# Patient Record
Sex: MALE | Race: BLACK OR AFRICAN AMERICAN | Employment: OTHER | URBAN - METROPOLITAN AREA
[De-identification: names, ages, dates, MRNs, and addresses within clinical notes are randomized per-mention and may not be internally consistent; named-entity substitution may affect disease eponyms.]

---

## 2018-06-23 ENCOUNTER — APPOINTMENT (OUTPATIENT)
Dept: GENERAL RADIOLOGY | Facility: CLINIC | Age: 73
End: 2018-06-23
Attending: EMERGENCY MEDICINE
Payer: MEDICAID

## 2018-06-23 ENCOUNTER — APPOINTMENT (OUTPATIENT)
Dept: ULTRASOUND IMAGING | Facility: CLINIC | Age: 73
End: 2018-06-23
Attending: EMERGENCY MEDICINE
Payer: MEDICAID

## 2018-06-23 ENCOUNTER — HOSPITAL ENCOUNTER (OUTPATIENT)
Facility: CLINIC | Age: 73
Setting detail: OBSERVATION
Discharge: HOME HEALTH | End: 2018-06-24
Attending: EMERGENCY MEDICINE | Admitting: HOSPITALIST
Payer: MEDICAID

## 2018-06-23 DIAGNOSIS — Z86.73 HISTORY OF CVA (CEREBROVASCULAR ACCIDENT): ICD-10-CM

## 2018-06-23 DIAGNOSIS — N30.00 ACUTE CYSTITIS WITHOUT HEMATURIA: Primary | ICD-10-CM

## 2018-06-23 DIAGNOSIS — N39.0 ACUTE UTI: ICD-10-CM

## 2018-06-23 LAB
ALBUMIN SERPL-MCNC: 3.5 G/DL (ref 3.4–5)
ALBUMIN UR-MCNC: 10 MG/DL
ALP SERPL-CCNC: 86 U/L (ref 40–150)
ALT SERPL W P-5'-P-CCNC: 12 U/L (ref 0–70)
ANION GAP SERPL CALCULATED.3IONS-SCNC: 6 MMOL/L (ref 3–14)
APPEARANCE UR: CLEAR
AST SERPL W P-5'-P-CCNC: 12 U/L (ref 0–45)
BACTERIA #/AREA URNS HPF: ABNORMAL /HPF
BASOPHILS # BLD AUTO: 0 10E9/L (ref 0–0.2)
BASOPHILS NFR BLD AUTO: 0.5 %
BILIRUB SERPL-MCNC: 0.6 MG/DL (ref 0.2–1.3)
BILIRUB UR QL STRIP: NEGATIVE
BUN SERPL-MCNC: 10 MG/DL (ref 7–30)
CALCIUM SERPL-MCNC: 8.6 MG/DL (ref 8.5–10.1)
CHLORIDE SERPL-SCNC: 109 MMOL/L (ref 94–109)
CO2 SERPL-SCNC: 26 MMOL/L (ref 20–32)
COLOR UR AUTO: YELLOW
CREAT SERPL-MCNC: 0.77 MG/DL (ref 0.66–1.25)
DIFFERENTIAL METHOD BLD: NORMAL
EOSINOPHIL # BLD AUTO: 0.2 10E9/L (ref 0–0.7)
EOSINOPHIL NFR BLD AUTO: 4 %
ERYTHROCYTE [DISTWIDTH] IN BLOOD BY AUTOMATED COUNT: 13.1 % (ref 10–15)
GFR SERPL CREATININE-BSD FRML MDRD: >90 ML/MIN/1.7M2
GLUCOSE BLDC GLUCOMTR-MCNC: 115 MG/DL (ref 70–99)
GLUCOSE BLDC GLUCOMTR-MCNC: 160 MG/DL (ref 70–99)
GLUCOSE SERPL-MCNC: 119 MG/DL (ref 70–99)
GLUCOSE UR STRIP-MCNC: NEGATIVE MG/DL
HCT VFR BLD AUTO: 41.5 % (ref 40–53)
HGB BLD-MCNC: 13.9 G/DL (ref 13.3–17.7)
HGB UR QL STRIP: ABNORMAL
IMM GRANULOCYTES # BLD: 0 10E9/L (ref 0–0.4)
IMM GRANULOCYTES NFR BLD: 0.2 %
KETONES UR STRIP-MCNC: NEGATIVE MG/DL
LACTATE BLD-SCNC: 1.3 MMOL/L (ref 0.7–2)
LEUKOCYTE ESTERASE UR QL STRIP: ABNORMAL
LYMPHOCYTES # BLD AUTO: 1.8 10E9/L (ref 0.8–5.3)
LYMPHOCYTES NFR BLD AUTO: 30.1 %
MCH RBC QN AUTO: 29.4 PG (ref 26.5–33)
MCHC RBC AUTO-ENTMCNC: 33.5 G/DL (ref 31.5–36.5)
MCV RBC AUTO: 88 FL (ref 78–100)
MONOCYTES # BLD AUTO: 0.4 10E9/L (ref 0–1.3)
MONOCYTES NFR BLD AUTO: 7.3 %
NEUTROPHILS # BLD AUTO: 3.5 10E9/L (ref 1.6–8.3)
NEUTROPHILS NFR BLD AUTO: 57.9 %
NITRATE UR QL: POSITIVE
NRBC # BLD AUTO: 0 10*3/UL
NRBC BLD AUTO-RTO: 0 /100
PH UR STRIP: 5.5 PH (ref 5–7)
PLATELET # BLD AUTO: 170 10E9/L (ref 150–450)
POTASSIUM SERPL-SCNC: 3.8 MMOL/L (ref 3.4–5.3)
PROT SERPL-MCNC: 7.5 G/DL (ref 6.8–8.8)
RBC # BLD AUTO: 4.73 10E12/L (ref 4.4–5.9)
RBC #/AREA URNS AUTO: 4 /HPF (ref 0–2)
SODIUM SERPL-SCNC: 141 MMOL/L (ref 133–144)
SOURCE: ABNORMAL
SP GR UR STRIP: 1.01 (ref 1–1.03)
SQUAMOUS #/AREA URNS AUTO: 1 /HPF (ref 0–1)
UROBILINOGEN UR STRIP-MCNC: NORMAL MG/DL (ref 0–2)
WBC # BLD AUTO: 6 10E9/L (ref 4–11)
WBC #/AREA URNS AUTO: 28 /HPF (ref 0–5)

## 2018-06-23 PROCEDURE — 87088 URINE BACTERIA CULTURE: CPT | Performed by: EMERGENCY MEDICINE

## 2018-06-23 PROCEDURE — 25000131 ZZH RX MED GY IP 250 OP 636 PS 637: Performed by: HOSPITALIST

## 2018-06-23 PROCEDURE — G0378 HOSPITAL OBSERVATION PER HR: HCPCS

## 2018-06-23 PROCEDURE — 96365 THER/PROPH/DIAG IV INF INIT: CPT

## 2018-06-23 PROCEDURE — 87040 BLOOD CULTURE FOR BACTERIA: CPT | Mod: 91 | Performed by: EMERGENCY MEDICINE

## 2018-06-23 PROCEDURE — 80053 COMPREHEN METABOLIC PANEL: CPT | Performed by: EMERGENCY MEDICINE

## 2018-06-23 PROCEDURE — 71046 X-RAY EXAM CHEST 2 VIEWS: CPT

## 2018-06-23 PROCEDURE — 00000146 ZZHCL STATISTIC GLUCOSE BY METER IP

## 2018-06-23 PROCEDURE — 93971 EXTREMITY STUDY: CPT | Mod: LT

## 2018-06-23 PROCEDURE — 25000128 H RX IP 250 OP 636: Performed by: EMERGENCY MEDICINE

## 2018-06-23 PROCEDURE — 85025 COMPLETE CBC W/AUTO DIFF WBC: CPT | Performed by: EMERGENCY MEDICINE

## 2018-06-23 PROCEDURE — 99219 ZZC INITIAL OBSERVATION CARE,LEVL II: CPT | Performed by: HOSPITALIST

## 2018-06-23 PROCEDURE — 87186 SC STD MICRODIL/AGAR DIL: CPT | Performed by: EMERGENCY MEDICINE

## 2018-06-23 PROCEDURE — 25000132 ZZH RX MED GY IP 250 OP 250 PS 637: Performed by: HOSPITALIST

## 2018-06-23 PROCEDURE — 36415 COLL VENOUS BLD VENIPUNCTURE: CPT

## 2018-06-23 PROCEDURE — 83605 ASSAY OF LACTIC ACID: CPT | Performed by: EMERGENCY MEDICINE

## 2018-06-23 PROCEDURE — 87086 URINE CULTURE/COLONY COUNT: CPT | Performed by: EMERGENCY MEDICINE

## 2018-06-23 PROCEDURE — 96372 THER/PROPH/DIAG INJ SC/IM: CPT

## 2018-06-23 PROCEDURE — 81001 URINALYSIS AUTO W/SCOPE: CPT | Performed by: EMERGENCY MEDICINE

## 2018-06-23 PROCEDURE — 99285 EMERGENCY DEPT VISIT HI MDM: CPT | Mod: 25

## 2018-06-23 RX ORDER — DEXTROSE MONOHYDRATE 25 G/50ML
25-50 INJECTION, SOLUTION INTRAVENOUS
Status: DISCONTINUED | OUTPATIENT
Start: 2018-06-23 | End: 2018-06-24 | Stop reason: HOSPADM

## 2018-06-23 RX ORDER — SODIUM CHLORIDE 9 MG/ML
1000 INJECTION, SOLUTION INTRAVENOUS CONTINUOUS
Status: DISCONTINUED | OUTPATIENT
Start: 2018-06-23 | End: 2018-06-23

## 2018-06-23 RX ORDER — AMOXICILLIN 250 MG
1 CAPSULE ORAL 2 TIMES DAILY PRN
Status: DISCONTINUED | OUTPATIENT
Start: 2018-06-23 | End: 2018-06-24 | Stop reason: HOSPADM

## 2018-06-23 RX ORDER — ACETAMINOPHEN 650 MG/1
650 SUPPOSITORY RECTAL EVERY 4 HOURS PRN
Status: DISCONTINUED | OUTPATIENT
Start: 2018-06-23 | End: 2018-06-24 | Stop reason: HOSPADM

## 2018-06-23 RX ORDER — ONDANSETRON 2 MG/ML
4 INJECTION INTRAMUSCULAR; INTRAVENOUS EVERY 30 MIN PRN
Status: DISCONTINUED | OUTPATIENT
Start: 2018-06-23 | End: 2018-06-24 | Stop reason: HOSPADM

## 2018-06-23 RX ORDER — NICOTINE POLACRILEX 4 MG
15-30 LOZENGE BUCCAL
Status: DISCONTINUED | OUTPATIENT
Start: 2018-06-23 | End: 2018-06-24 | Stop reason: HOSPADM

## 2018-06-23 RX ORDER — ONDANSETRON 4 MG/1
4 TABLET, ORALLY DISINTEGRATING ORAL EVERY 4 HOURS PRN
Qty: 10 TABLET | Refills: 0 | Status: SHIPPED | OUTPATIENT
Start: 2018-06-23 | End: 2018-06-26

## 2018-06-23 RX ORDER — CEPHALEXIN 500 MG/1
500 CAPSULE ORAL 4 TIMES DAILY
Qty: 40 CAPSULE | Refills: 0 | Status: SHIPPED | OUTPATIENT
Start: 2018-06-23 | End: 2018-06-24

## 2018-06-23 RX ORDER — ASPIRIN 81 MG/1
81 TABLET, CHEWABLE ORAL DAILY
Status: DISCONTINUED | OUTPATIENT
Start: 2018-06-24 | End: 2018-06-24 | Stop reason: HOSPADM

## 2018-06-23 RX ORDER — POLYETHYLENE GLYCOL 3350 17 G/17G
17 POWDER, FOR SOLUTION ORAL DAILY PRN
Status: DISCONTINUED | OUTPATIENT
Start: 2018-06-23 | End: 2018-06-24 | Stop reason: HOSPADM

## 2018-06-23 RX ORDER — ONDANSETRON 4 MG/1
4 TABLET, ORALLY DISINTEGRATING ORAL EVERY 6 HOURS PRN
Status: DISCONTINUED | OUTPATIENT
Start: 2018-06-23 | End: 2018-06-24 | Stop reason: HOSPADM

## 2018-06-23 RX ORDER — LISINOPRIL 10 MG/1
10 TABLET ORAL DAILY
Status: DISCONTINUED | OUTPATIENT
Start: 2018-06-24 | End: 2018-06-24 | Stop reason: HOSPADM

## 2018-06-23 RX ORDER — AMOXICILLIN 250 MG
2 CAPSULE ORAL 2 TIMES DAILY PRN
Status: DISCONTINUED | OUTPATIENT
Start: 2018-06-23 | End: 2018-06-24 | Stop reason: HOSPADM

## 2018-06-23 RX ORDER — LIDOCAINE 40 MG/G
CREAM TOPICAL
Status: DISCONTINUED | OUTPATIENT
Start: 2018-06-23 | End: 2018-06-24 | Stop reason: HOSPADM

## 2018-06-23 RX ORDER — ACETAMINOPHEN 325 MG/1
650 TABLET ORAL EVERY 4 HOURS PRN
Status: DISCONTINUED | OUTPATIENT
Start: 2018-06-23 | End: 2018-06-24 | Stop reason: HOSPADM

## 2018-06-23 RX ORDER — ONDANSETRON 2 MG/ML
4 INJECTION INTRAMUSCULAR; INTRAVENOUS EVERY 6 HOURS PRN
Status: DISCONTINUED | OUTPATIENT
Start: 2018-06-23 | End: 2018-06-24 | Stop reason: HOSPADM

## 2018-06-23 RX ORDER — SODIUM CHLORIDE 9 MG/ML
1000 INJECTION, SOLUTION INTRAVENOUS CONTINUOUS
Status: DISCONTINUED | OUTPATIENT
Start: 2018-06-23 | End: 2018-06-24 | Stop reason: HOSPADM

## 2018-06-23 RX ORDER — NALOXONE HYDROCHLORIDE 0.4 MG/ML
.1-.4 INJECTION, SOLUTION INTRAMUSCULAR; INTRAVENOUS; SUBCUTANEOUS
Status: DISCONTINUED | OUTPATIENT
Start: 2018-06-23 | End: 2018-06-24 | Stop reason: HOSPADM

## 2018-06-23 RX ORDER — CEFTRIAXONE 1 G/1
1 INJECTION, POWDER, FOR SOLUTION INTRAMUSCULAR; INTRAVENOUS EVERY 24 HOURS
Status: DISCONTINUED | OUTPATIENT
Start: 2018-06-24 | End: 2018-06-24 | Stop reason: HOSPADM

## 2018-06-23 RX ORDER — CEFTRIAXONE 1 G/1
1 INJECTION, POWDER, FOR SOLUTION INTRAMUSCULAR; INTRAVENOUS ONCE
Status: COMPLETED | OUTPATIENT
Start: 2018-06-23 | End: 2018-06-23

## 2018-06-23 RX ADMIN — CEFTRIAXONE 1 G: 1 INJECTION, POWDER, FOR SOLUTION INTRAMUSCULAR; INTRAVENOUS at 13:04

## 2018-06-23 RX ADMIN — INSULIN ASPART 1 UNITS: 100 INJECTION, SOLUTION INTRAVENOUS; SUBCUTANEOUS at 19:35

## 2018-06-23 RX ADMIN — SODIUM CHLORIDE 1000 ML: 9 INJECTION, SOLUTION INTRAVENOUS at 17:10

## 2018-06-23 RX ADMIN — SENNOSIDES AND DOCUSATE SODIUM 2 TABLET: 8.6; 5 TABLET ORAL at 21:57

## 2018-06-23 RX ADMIN — SODIUM CHLORIDE 1000 ML: 9 INJECTION, SOLUTION INTRAVENOUS at 12:35

## 2018-06-23 NOTE — H&P
[facility]    History and Physical  Hospitalist       Date of Admission:  6/23/2018    Assessment & Plan   Hali Miller is a 73 year old Amheric-speaking male with past history DM II, HTN, CVA with left hemiparesis, sepsis due to prostate abscess s/p TURP 8/2017 who presents with dysuria, found to have UTI.    UTI: Admission UA with 28 WBC, positive nitrites and LE.  No SIRS criteria present.  - continue ceftriaxone and follow urine culture    Hypertension: Continue prior to admission lisinopril    Diabetes mellitus type 2: Hold prior to admission metformin, start medium dose sliding scale insulin.    History of CVA with left hemiparesis: Continue prior to admission aspirin.  Hold prior to admission statin as Obs.      DVT Prophylaxis: Low Risk/Ambulatory with no VTE prophylaxis indicated  Code Status: Full Code; this was unable to be discussed with family as no  present    # Pain Assessment:  Current Pain Score 6/23/2018   Pain score (0-10) 5   Hali s pain level was assessed and he currently denies pain.          Disposition: Expected discharge in 1 days once symptoms improved, no signs of sepsis.    Niles Hameed    Primary Care Physician   [facility]    Chief Complaint   dysuria    History is obtained from discussion with patient's nephew via phone.  Nephew was present during discussion with patient and ED physician and had left at time of my arrival.  Family declined professional  services.    History of Present Illness   Hali Miller is a 73 year old male who presents with dysuria.  Patient was reportedly complaining of dysuria over the past 1-2 weeks.  Family also noted poor appetite and poor sleep over this time.  Nephew reported fever at home today, though it is unclear how high.  Family also noted left foot swelling, patient also complained of left knee pain.  There have been no known falls.  Family also complaining that metformin  causes issues with nausea and vomiting and results in poor appetite.  Lastly, patient's wife noted that he has been breathing heavily at night.  Unable to obtain further review of systems secondary to no  present at bedside during my visit with patient.  Workup in the emergency room was largely unremarkable other than abnormal urinalysis.  Secondary to his history of prostate abscess with sepsis, family elected for observation overnight prior to taking patient home.    Past Medical History    Diabetes mellitus type 2  Hypertension  CVA with left hemiparesis  History of prostate abscess with sepsis status post TURP    Past Surgical History   TURP August 2017    Prior to Admission Medications   Prior to Admission Medications   Prescriptions Last Dose Informant Patient Reported? Taking?   ASPIRIN PO 6/22/2018 at am Son Yes Yes   Sig: Take 81 mg by mouth daily   Atorvastatin Calcium (LIPITOR PO) Past Week at Unknown time Son Yes Yes   Sig: Take 20 mg by mouth daily    LISINOPRIL PO Past Week at Unknown time Son Yes Yes   Sig: Take 10 mg by mouth daily   METFORMIN HCL ER PO Past Week at Unknown time Son Yes Yes   Sig: Take 1,000 mg by mouth 2 times daily      Facility-Administered Medications: None     Allergies   No Known Allergies    Social History   I have personally reviewed the social history with the patient showing no tobacco or alcohol use per report of nephew.    Family History   Nephew reports no known significant family history.    Review of Systems   10 point review of systems was unable to be obtained secondary to lack of professional  services.    Physical Exam   Temp: 99.4  F (37.4  C) Temp src: Temporal BP: 149/79 Pulse: 70 Heart Rate: 73 Resp: 14 SpO2: 98 % O2 Device: None (Room air)    Vital Signs with Ranges  Temp:  [99.4  F (37.4  C)] 99.4  F (37.4  C)  Pulse:  [70-73] 70  Heart Rate:  [73] 73  Resp:  [14-16] 14  BP: (140-149)/(72-79) 149/79  SpO2:  [95 %-98 %] 98 %  160 lbs 0  oz    Constitutional: well developed, well nourished male in no acute distress  Eyes: pupils equal, round and reactive to light, no icterus  HEENT: head normocephalic, atraumatic, mucous membranes moist, no cervical lymphadenopathy  Respiratory: small inspirations, but appear bilaterally without crackles or wheezes, no tachypnea  Cardiovascular: regular rate and rhythm, normal S1/S2, no murmur, rubs or gallops  GI: abdomen soft, non-tender, non-distended, normal bowel sounds, no hepatosplenomegaly  Lymph/Hematologic: 1+ left lower leg edema  Skin: No rash or bruising  Musculoskeletal: Extremities warm and well perfused  Neurologic: alert, cranial nerves grossly intact, gross left sided weakness      Data   Data reviewed today:  I personally reviewed no images or EKG's today.    Recent Labs  Lab 06/23/18  1130   WBC 6.0   HGB 13.9   MCV 88         POTASSIUM 3.8   CHLORIDE 109   CO2 26   BUN 10   CR 0.77   ANIONGAP 6   AMARI 8.6   *   ALBUMIN 3.5   PROTTOTAL 7.5   BILITOTAL 0.6   ALKPHOS 86   ALT 12   AST 12       Recent Results (from the past 24 hour(s))   XR Chest 2 Views    Narrative    XR CHEST 2 VW 6/23/2018 12:03 PM    HISTORY: fever;       Impression    IMPRESSION: Negative.    JEAN-PIERRE POOLE MD   US Lower Extremity Venous Duplex Left    Narrative    US LOWER EXTREMITY VENOUS DUPLEX LEFT 6/23/2018 12:24 PM    HISTORY:  Leg swelling.    FINDINGS: Negative. No evidence for DVT. Short term followup  recommended if symptoms persist.     Doppler wave form analysis performed.    JEAN-PIERRE POOLE MD

## 2018-06-23 NOTE — IP AVS SNAPSHOT
STOP!!! DO NOT PRINT OR REFERENCE THIS AVS!!!  AVS displayed here is NOT the version that was given to the patient at discharge.  GO TO CHART REVIEW to print or review a copy of the AVS that was frozen/printed at time of discharge.                           MRN:[id number]                      After Visit Summary   6/23/2018    Hali Miller    MRN: [id number]           Thank you!     Thank you for choosing [facility] for your care. Our goal is always to provide you with excellent care. Hearing back from our patients is one way we can continue to improve our services. Please take a few minutes to complete the written survey that you may receive in the mail after you visit with us. Thank you!        Patient Information     Date Of Birth          1945        About your hospital stay     You were admitted on:  June 23, 2018 [facility]    You were discharged on:  June 24, 2018        Reason for your hospital stay       You were here for evaluation of weakness and urinary tract infection.                  Who to Call     For medical emergencies, please call 911.  For non-urgent questions about your medical care, please call your primary care provider or clinic, [phone]          Attending Provider     Provider Specialty    Chiquis Chung MD Emergency Medicine    Chiqui Moreno MD Emergency Medicine    Niles Hameed MD Internal Medicine       Primary Care Provider Office Phone # Fax #    [facility] [phone]      After Care Instructions     Activity       Your activity upon discharge: activity as tolerated            Diet       Follow this diet upon discharge: Orders Placed This Encounter      Moderate Consistent CHO Diet            Discharge Instructions       Continue antibiotic for 3 days    Make sure to drink plenty of fluids    For constipation at home you can try over the counter miralax or senna. You  can titrate dose of miralax as needed: for example- start with one capful per day. If stools are too loose you can try every other day or use only 1/2 capful instead.                  Follow-up Appointments     Follow-up and recommended labs and tests        Follow up with primary care provider next week to establish care and for hospital follow up.                  Additional Services     Home Care OT Referral for Hospital Discharge       OT to eval and treat    Your provider has ordered home care - occupational therapy. If you have not been contacted within 2 days of your discharge please call the department phone number listed on the top of this document.            Home Care PT Referral for Hospital Discharge       PT to eval and treat    Your provider has ordered home care - physical therapy. If you have not been contacted within 2 days of your discharge please call the department phone number listed on the top of this document.            Home Care Social Service Referral for Hospital Discharge        to assist family who provides 24 hour total cares. May need more equipment at home, transportation resources, etc.     Your provider has ordered home care - . If you have not been contacted within 2 days of your discharge please call the department phone number listed on the top of this document.            Home care nursing referral       RN skilled nursing visit. RN to assess vital signs and weight, respiratory and cardiac status, incision for signs/symptoms of infection, hydration, nutrition and bowel status and home safety.  Home health aide to assist with bathing, cares.     Your provider has ordered home care nursing services. If you have not been contacted within 2 days of your discharge please call the inpatient department phone number at [phone] .                  Further instructions from your care team       Return if you have worsening symptoms.   Discharge  Instructions  Urinary Tract Infection  You or your child have been diagnosed with a urinary tract infection, or UTI. The urinary tract includes the kidneys (which make urine/pee), ureters (the tubes that carry urine/pee from the kidneys to the bladder), the bladder (which stores urine/pee), and urethra (the tube that carries urine/pee out of the bladder). Urinary tract infections occur when bacteria travel up the urethra into the bladder (bladder infection) and, in some cases, from there into the kidneys (kidney infection).  Generally, every Emergency Department visit should have a follow-up clinic visit with either a primary or a specialty clinic/provider. Please follow-up as instructed by your emergency provider today.  Return to the Emergency Department if:    You or your child have severe back pain.    You or your child are vomiting (throwing up) so that you cannot take your medicine.    You or your child have a new fever (had not previously had a fever) over 101 F.    You or your child have confusion or are very weak, or feel very ill.    Your child seems much more ill, will not wake up, will not respond right, or is crying for a long time and will not calm down.    You or your child are showing signs of dehydration. These signs may include decreased urination (pee), dry mouth/gums/tongue, or decreased activity.    Follow-up with your provider:     Children under 24 months need to be seen by their regular provider within one week after a diagnosis of a UTI. It may be necessary to do some more tests to look at the child s kidney or bladder.    You should begin to feel better within 24 - 48 hours of starting your antibiotic; follow-up with your regular clinic/doctor/provider if this is not the case.    Treatment:     You will be treated with an antibiotic to kill the bacteria. We have to make an educated guess, based on what we know about common bacteria and antibiotics, as to which antibiotic will work for your  "infection. We will be correct most times but there will be some cases where the antibiotic chosen is not correct (see urine cultures below).    Take a pain medication such as acetaminophen (Tylenol ) or ibuprofen (Advil , Motrin , Nuprin ).    Phenazopyridine (Pyridium , Uristat ) is a prescription medication that numbs the bladder to reduce the burning pain of some UTIs.  The same medication is available in a non-prescription version (Azo-Standard , Urodol ). This medication will change the color of the urine and tears (usually blue or orange). If you wear contacts, do not wear them while taking this medication as they may be stained by the medication.    Urine Cultures:    If indicated, a urine culture may have been performed today. This test generally takes 24-48 hours to complete so the results are not known at this time. The results can confirm that an infection is present but also determine which antibiotic is effective for the specific bacteria that is causing the infection. If your urine culture shows that the antibiotic you were given today will not work to treat your infection, we will attempt to contact you to make arrangements to change the antibiotic. If the culture confirms that the antibiotic is effective for your infection, you will not be contacted. We often recommend follow-up with your regular physician/provider on the culture results regardless of this process.    Antibiotic Warning:     If you have been placed on antibiotics - watch for signs of allergic reaction.  These include rash, lip swelling, difficulty breathing, wheezing, and dizziness.  If you develop any of these symptoms, stop the antibiotic immediately and go to an emergency room or urgent care for evaluation.    Probiotics: If you have been given an antibiotic, you may want to also take a probiotic pill or eat yogurt with live cultures. Probiotics have \"good bacteria\" to help your intestines stay healthy. Studies have shown that " "probiotics help prevent diarrhea and other intestine problems (including C. diff infection) when you take antibiotics. You can buy these without a prescription in the pharmacy section of the store.   If you were given a prescription for medicine here today, be sure to read all of the information (including the package insert) that comes with your prescription.  This will include important information about the medicine, its side effects, and any warnings that you need to know about.  The pharmacist who fills the prescription can provide more information and answer questions you may have about the medicine.  If you have questions or concerns that the pharmacist cannot address, please call or return to the Emergency Department.   Remember that you can always come back to the Emergency Department if you are not able to see your regular provider in the amount of time listed above, if you get any new symptoms, or if there is anything that worries you.    Pending Results     Date and Time Order Name Status Description    6/23/2018 1206 Urine Culture Aerobic Bacterial Preliminary     6/23/2018 1120 Blood culture Preliminary     6/23/2018 1120 Blood culture Preliminary             Statement of Approval     Ordered          06/24/18 1121  I have reviewed and agree with all the recommendations and orders detailed in this document.  EFFECTIVE NOW     Approved and electronically signed by:  Niles Hameed MD             Admission Information     Date & Time Department Dept. Phone    6/23/2018 [facility] [phone]      Your Vitals Were     Blood Pressure Pulse Temperature Respirations Height Weight    145/80 (BP Location: Right arm) 67 97.1  F (36.2  C) (Axillary) 16 5' 8\" (1.727 m) 153 lb 11.2 oz (69.7 kg)    Pulse Oximetry BMI (Body Mass Index)                96% 23.37 kg/m2          MyChart Information     [employer] lets you send messages to your doctor, view your test results, renew your prescriptions, " "schedule appointments and more. To sign up, go to [email or web address] . Click on \"Log in\" on the left side of the screen, which will take you to the Welcome page. Then click on \"Sign up Now\" on the right side of the page.     You will be asked to enter the access code listed below, as well as some personal information. Please follow the directions to create your username and password.     Your access code is: [email or web address]  Expires: 2018  1:33 PM     Your access code will  in 90 days. If you need help or a new code, please call your [place] clinic or [phone].        Care EveryWhere ID     This is your Care EveryWhere ID. This could be used by other organizations to access your [place] medical records  [id number]        Equal Access to Services     MAYELA CROOKS : Ezekiel Lackey, jenny veloz, brennan castillo, suhail oneill . So [facility] [phone].    ATENCIÓN: Si habla español, tiene a chase disposición servicios gratuitos de asistencia lingüística. Andrés al [phone].    We comply with applicable federal civil rights laws and [place] laws. We do not discriminate on the basis of race, color, national origin, age, disability, sex, sexual orientation, or gender identity.               Review of your medicines      START taking        Dose / Directions    ciprofloxacin 500 MG tablet   Commonly known as:  CIPRO   Used for:  Acute cystitis without hematuria        Dose:  500 mg   Take 1 tablet (500 mg) by mouth 2 times daily for 7 days   Quantity:  14 tablet   Refills:  0       ondansetron 4 MG ODT tab   Commonly known as:  ZOFRAN ODT        Dose:  4 mg   Take 1 tablet (4 mg) by mouth every 4 hours as needed for nausea   Quantity:  10 tablet   Refills:  0         CONTINUE these medicines which have NOT CHANGED        Dose / Directions    ASPIRIN PO        Dose:  81 mg   Take 81 mg by mouth daily   Refills:  0       LIPITOR PO   Notes to " Patient:  Resume per patient        Dose:  20 mg   Take 20 mg by mouth daily   Refills:  0       LISINOPRIL PO        Dose:  10 mg   Take 10 mg by mouth daily   Refills:  0       METFORMIN HCL ER PO   Notes to Patient:  Resume per patient        Dose:  1000 mg   Take 1,000 mg by mouth 2 times daily   Refills:  0            Where to get your medicines      These medications were sent to [facility] [place], [place] - [address]  [address], [place] [address]     Phone:  [phone]     ciprofloxacin 500 MG tablet         Some of these will need a paper prescription and others can be bought over the counter. Ask your nurse if you have questions.     Bring a paper prescription for each of these medications     ondansetron 4 MG ODT tab                Protect others around you: Learn how to safely use, store and throw away your medicines at www.disposemymeds.org.        ANTIBIOTIC INSTRUCTION     You've Been Prescribed an Antibiotic - Now What?  Your healthcare team thinks that you or your loved one might have an infection. Some infections can be treated with antibiotics, which are powerful, life-saving drugs. Like all medications, antibiotics have side effects and should only be used when necessary. There are some important things you should know about your antibiotic treatment.      Your healthcare team may run tests before you start taking an antibiotic.    Your team may take samples (e.g., from your blood, urine or other areas) to run tests to look for bacteria. These test can be important to determine if you need an antibiotic at all and, if you do, which antibiotic will work best.      Within a few days, your healthcare team might change or even stop your antibiotic.    Your team may start you on an antibiotic while they are working to find out what is making you sick.    Your team might change your antibiotic because test results show that a different antibiotic would be better to  treat your infection.    In some cases, once your team has more information, they learn that you do not need an antibiotic at all. They may find out that you don't have an infection, or that the antibiotic you're taking won't work against your infection. For example, an infection caused by a virus can't be treated with antibiotics. Staying on an antibiotic when you don't need it is more likely to be harmful than helpful.      You may experience side effects from your antibiotic.    Like all medications, antibiotics have side effects. Some of these can be serious.    Let you healthcare team know if you have any known allergies when you are admitted to the hospital.    One significant side effect of nearly all antibiotics is the risk of severe and sometimes deadly diarrhea caused by Clostridium difficile (C. Difficile). This occurs when a person takes antibiotics because some good germs are destroyed. Antibiotic use allows C. diificile to take over, putting patients at high risk for this serious infection.    As a patient or caregiver, it is important to understand your or your loved one's antibiotic treatment. It is especially important for caregivers to speak up when patients can't speak for themselves. Here are some important questions to ask your healthcare team.    What infection is this antibiotic treating and how do you know I have that infection?    What side effects might occur from this antibiotic?    How long will I need to take this antibiotic?    Is it safe to take this antibiotic with other medications or supplements (e.g., vitamins) that I am taking?     Are there any special directions I need to know about taking this antibiotic? For example, should I take it with food?    How will I be monitored to know whether my infection is responding to the antibiotic?    What tests may help to make sure the right antibiotic is prescribed for me?      Information provided by:  www.cdc.gov/getsmart  U.S. Department  of Health and Human Services  Centers for disease Control and Prevention  National Center for Emerging and Zoonotic Infectious Diseases  Division of Healthcare Quality Promotion             Medication List: This is a list of all your medications and when to take them. Check marks below indicate your daily home schedule. Keep this list as a reference.      Medications           Morning Afternoon Evening Bedtime As Needed    ASPIRIN PO   Take 81 mg by mouth daily   Last time this was given:  81 mg on 6/24/2018  8:53 AM            6/25/18                       ciprofloxacin 500 MG tablet   Commonly known as:  CIPRO   Take 1 tablet (500 mg) by mouth 2 times daily for 7 days                                LIPITOR PO   Take 20 mg by mouth daily   Notes to Patient:  Resume per patient                                LISINOPRIL PO   Take 10 mg by mouth daily   Last time this was given:  10 mg on 6/24/2018  8:53 AM            6/25/18                       METFORMIN HCL ER PO   Take 1,000 mg by mouth 2 times daily   Notes to Patient:  Resume per patient                                ondansetron 4 MG ODT tab   Commonly known as:  ZOFRAN ODT   Take 1 tablet (4 mg) by mouth every 4 hours as needed for nausea

## 2018-06-23 NOTE — PROGRESS NOTES
RECEIVING UNIT ED HANDOFF REVIEW    ED Nurse Handoff Report was reviewed by: Zenaida Blackwood on June 23, 2018 at 3:37 PM

## 2018-06-23 NOTE — ED AVS SNAPSHOT
Emergency Department    [address] [facility] [address]    Phone:  [phone]    Fax:  [phone]                                       Hali Miller   MRN: [id number]    Department:   Emergency Department   Date of Visit:  6/23/2018           After Visit Summary Signature Page     I have received my discharge instructions, and my questions have been answered. I have discussed any challenges I see with this plan with the nurse or doctor.    ..........................................................................................................................................  Patient/Patient Representative Signature      ..........................................................................................................................................  Patient Representative Print Name and Relationship to Patient    ..................................................               ................................................  Date                                            Time    ..........................................................................................................................................  Reviewed by Signature/Title    ...................................................              ..............................................  Date                                                            Time

## 2018-06-23 NOTE — IP AVS SNAPSHOT
"    [place] [facility]: [phone]                                              INTERAGENCY TRANSFER FORM - PHYSICIAN ORDERS   2018                    Hospital Admission Date: 2018  TIMOTHY CATES   : 1945  Sex: Male        Attending Provider: (none)    Allergies:  No Known Allergies    Infection:  None   Service:  HOSPITALIST    Ht:  5' 8\" (1.727 m)   Wt:  153 lb 11.2 oz (69.7 kg)   Admission Wt:  160 lb (72.6 kg)    BMI:  23.37 kg/m 2   BSA:  1.83 m 2            Patient PCP Information     Provider PCP Type    [facility]      ED Clinical Impression     Diagnosis Description Comment Added By Time Added    Acute UTI [N39.0] Acute UTI [N39.0]  Chiqui Moreno MD 2018 12:55 PM      Hospital Problems as of 2018              Priority Class Noted POA    UTI (urinary tract infection) Medium  2018 Yes      Non-Hospital Problems as of 2018     None      Code Status History     Date Active Date Inactive Code Status Order ID Comments User Context    2018 11:21 AM  Full Code [id number]  Lian Jo PA-C Outpatient    2018  4:53 PM 2018 11:21 AM Full Code [id number]  Niles Hameed MD Inpatient         Medication Review      START taking        Dose / Directions Comments    ciprofloxacin 500 MG tablet   Commonly known as:  CIPRO   Used for:  Acute cystitis without hematuria        Dose:  500 mg   Take 1 tablet (500 mg) by mouth 2 times daily for 7 days   Quantity:  14 tablet   Refills:  0        ondansetron 4 MG ODT tab   Commonly known as:  ZOFRAN ODT        Dose:  4 mg   Take 1 tablet (4 mg) by mouth every 4 hours as needed for nausea   Quantity:  10 tablet   Refills:  0          CONTINUE these medications which have NOT CHANGED        Dose / Directions Comments    ASPIRIN PO        Dose:  81 mg   Take 81 mg by mouth daily   Refills:  0        LIPITOR PO   Notes to Patient:  Resume per patient        Dose:  20 mg "   Take 20 mg by mouth daily   Refills:  0        LISINOPRIL PO        Dose:  10 mg   Take 10 mg by mouth daily   Refills:  0        METFORMIN HCL ER PO   Notes to Patient:  Resume per patient        Dose:  1000 mg   Take 1,000 mg by mouth 2 times daily   Refills:  0                  Further instructions from your care team       Return if you have worsening symptoms.   Discharge Instructions  Urinary Tract Infection  You or your child have been diagnosed with a urinary tract infection, or UTI. The urinary tract includes the kidneys (which make urine/pee), ureters (the tubes that carry urine/pee from the kidneys to the bladder), the bladder (which stores urine/pee), and urethra (the tube that carries urine/pee out of the bladder). Urinary tract infections occur when bacteria travel up the urethra into the bladder (bladder infection) and, in some cases, from there into the kidneys (kidney infection).  Generally, every Emergency Department visit should have a follow-up clinic visit with either a primary or a specialty clinic/provider. Please follow-up as instructed by your emergency provider today.  Return to the Emergency Department if:    You or your child have severe back pain.    You or your child are vomiting (throwing up) so that you cannot take your medicine.    You or your child have a new fever (had not previously had a fever) over 101 F.    You or your child have confusion or are very weak, or feel very ill.    Your child seems much more ill, will not wake up, will not respond right, or is crying for a long time and will not calm down.    You or your child are showing signs of dehydration. These signs may include decreased urination (pee), dry mouth/gums/tongue, or decreased activity.    Follow-up with your provider:     Children under 24 months need to be seen by their regular provider within one week after a diagnosis of a UTI. It may be necessary to do some more tests to look at the child s kidney or  bladder.    You should begin to feel better within 24 - 48 hours of starting your antibiotic; follow-up with your regular clinic/doctor/provider if this is not the case.    Treatment:     You will be treated with an antibiotic to kill the bacteria. We have to make an educated guess, based on what we know about common bacteria and antibiotics, as to which antibiotic will work for your infection. We will be correct most times but there will be some cases where the antibiotic chosen is not correct (see urine cultures below).    Take a pain medication such as acetaminophen (Tylenol ) or ibuprofen (Advil , Motrin , Nuprin ).    Phenazopyridine (Pyridium , Uristat ) is a prescription medication that numbs the bladder to reduce the burning pain of some UTIs.  The same medication is available in a non-prescription version (Azo-Standard , Urodol ). This medication will change the color of the urine and tears (usually blue or orange). If you wear contacts, do not wear them while taking this medication as they may be stained by the medication.    Urine Cultures:    If indicated, a urine culture may have been performed today. This test generally takes 24-48 hours to complete so the results are not known at this time. The results can confirm that an infection is present but also determine which antibiotic is effective for the specific bacteria that is causing the infection. If your urine culture shows that the antibiotic you were given today will not work to treat your infection, we will attempt to contact you to make arrangements to change the antibiotic. If the culture confirms that the antibiotic is effective for your infection, you will not be contacted. We often recommend follow-up with your regular physician/provider on the culture results regardless of this process.    Antibiotic Warning:     If you have been placed on antibiotics - watch for signs of allergic reaction.  These include rash, lip swelling, difficulty  "breathing, wheezing, and dizziness.  If you develop any of these symptoms, stop the antibiotic immediately and go to an emergency room or urgent care for evaluation.    Probiotics: If you have been given an antibiotic, you may want to also take a probiotic pill or eat yogurt with live cultures. Probiotics have \"good bacteria\" to help your intestines stay healthy. Studies have shown that probiotics help prevent diarrhea and other intestine problems (including C. diff infection) when you take antibiotics. You can buy these without a prescription in the pharmacy section of the store.   If you were given a prescription for medicine here today, be sure to read all of the information (including the package insert) that comes with your prescription.  This will include important information about the medicine, its side effects, and any warnings that you need to know about.  The pharmacist who fills the prescription can provide more information and answer questions you may have about the medicine.  If you have questions or concerns that the pharmacist cannot address, please call or return to the Emergency Department.   Remember that you can always come back to the Emergency Department if you are not able to see your regular provider in the amount of time listed above, if you get any new symptoms, or if there is anything that worries you.    Summary of Visit     Reason for your hospital stay       You were here for evaluation of weakness and urinary tract infection.             After Care     Activity       Your activity upon discharge: activity as tolerated       Diet       Follow this diet upon discharge: Orders Placed This Encounter      Moderate Consistent CHO Diet       Discharge Instructions       Continue antibiotic for 3 days    Make sure to drink plenty of fluids    For constipation at home you can try over the counter miralax or senna. You can titrate dose of miralax as needed: for example- start with one capful per " day. If stools are too loose you can try every other day or use only 1/2 capful instead.             Referrals     Home Care OT Referral for Hospital Discharge       OT to eval and treat    Your provider has ordered home care - occupational therapy. If you have not been contacted within 2 days of your discharge please call the department phone number listed on the top of this document.       Home Care PT Referral for Hospital Discharge       PT to eval and treat    Your provider has ordered home care - physical therapy. If you have not been contacted within 2 days of your discharge please call the department phone number listed on the top of this document.       Home Care Social Service Referral for Hospital Discharge        to assist family who provides 24 hour total cares. May need more equipment at home, transportation resources, etc.     Your provider has ordered home care - . If you have not been contacted within 2 days of your discharge please call the department phone number listed on the top of this document.       Home care nursing referral       RN skilled nursing visit. RN to assess vital signs and weight, respiratory and cardiac status, incision for signs/symptoms of infection, hydration, nutrition and bowel status and home safety.  Home health aide to assist with bathing, cares.     Your provider has ordered home care nursing services. If you have not been contacted within 2 days of your discharge please call the inpatient department phone number at [phone] .              MD face to face encounter       Documentation of Face to Face and Certification for Home Health Services    I certify that patient: Hali Miller is under my care and that I, or a nurse practitioner or physician's assistant working with me, had a face-to-face encounter that meets the physician face-to-face encounter requirements with this patient on: 6/24/2018.    This encounter with the  patient was in whole, or in part, for the following medical condition, which is the primary reason for home health care: history of CVA, generalized weakness. bedbound.    I certify that, based on my findings, the following services are medically necessary home health services: Nursing, Occupational Therapy and Physical Therapy.    My clinical findings support the need for the above services because: Nurse is needed: to monitor vital signs, hydration status, help with bowel regimen., Occupational Therapy Services are needed to assess and treat cognitive ability and address ADL safety due to impairment in mobility after stroke. and Physical Therapy Services are needed to assess and treat the following functional impairments: decreased mobility after stroke    Further, I certify that my clinical findings support that this patient is homebound (i.e. absences from home require considerable and taxing effort and are for medical reasons or Hoahaoism services or infrequently or of short duration when for other reasons) because: Requires assistance of another person or specialized equipment to access medical services because patient: Requires supervision of another for safe transfer...    Based on the above findings. I certify that this patient is confined to the home and needs intermittent skilled nursing care, physical therapy and/or speech therapy.  The patient is under my care, and I have initiated the establishment of the plan of care.  This patient will be followed by a physician who will periodically review the plan of care.  Physician/Provider to provide follow up care: [facility]    Attending hospital physician (the Medicare certified [facility] provider): Dr. Niles Hameed  Physician Signature: See electronic signature associated with these discharge orders.  Date: 6/24/2018                  Follow-Up Appointment Instructions     Future Labs/Procedures    Follow-up and recommended labs and tests       Comments:    Follow up with primary care provider next week to establish care and for hospital follow up.      Follow-Up Appointment Instructions     Follow-up and recommended labs and tests        Follow up with primary care provider next week to establish care and for hospital follow up.             Statement of Approval     Ordered          06/24/18 1121  I have reviewed and agree with all the recommendations and orders detailed in this document.  EFFECTIVE NOW     Approved and electronically signed by:  Niles Hameed MD

## 2018-06-23 NOTE — ED PROVIDER NOTES
History     Chief Complaint:  Leg Swelling     The history is provided by a relative and the spouse. The history is limited by a language barrier and the condition of the patient. A  was used (The patient's nephew also interpreted).      Hali Miller is a 73 year old male with a history of CVA with deficits including left sided paralysis in 2015, [place] [facility], and hypertension and diabetes mellitus who presents to the emergency department for evaluation of leg swelling. His family has concerns for failure to thrive and eat. In addition, he also has new leg swelling. At baseline, the patient only speaks sometimes, and cannot speak English.      His nephew notes that the patient has been compliant on his medications, but for the past 3 days they believe is one particular medication that is causing him to vomit.(Metformin-which he has been on for many years without vomiting)  The patient has vomited 2-3 times in total. Moreover, he has not been sleeping well, has been having a hard time breathing while sleeping, has had a decrease in appetite, and has not had a bowel movement for 3-4 days. Notably, his wife states that she is sure that whenever the patient is constipated his left leg swells and, today the patient's left foot began swelling. He also has new left knee pain. The patient and his wife deny fever, other illness  symptoms, and recent falls. At baseline, the patient uses a wheelchair/walker to ambulate. Late in the ED course, he says that he has had some dysuria for the last few weeks.     Notably, in August 2017, the patient was admitted to the hospital [place] [facility] [place] sepsis secondary to a urinary tract infection. His CT Abdomen/Pelvis showed a prostate abscess, and he was started on Vancomycin and Zosyn at that time. Subsequently, the patient had a TURP that August. His urine and blood cultures also returned positive for pan-sensitive E-Coli at that time, and he was treated  "with Augmentin for 4 weeks.     Allergies:  No Known Drug Allergies      Medications:    Atorvastatin  Lisinopril  Metformin   Melatonin  Aspirin      Past Medical History:    Stroke (2015)  Left hemiparesis   Hypertension  Diabetes Mellitus  Hyperlipidemia    Sepsis  Prostate abscess  Sleep difficulties    Past Surgical History:    Prostate resection transurethral     Family History:    History reviewed. No pertinent family history.      Social history:  The patient was accompanied to the appointment by: his nephew, Barrera, and his wife, Ward.  Smoking Status: Never   Smokeless Tobacco: Never   Alcohol Use: Never   Marital Status:     Home: The patient moved to the [place] one year ago from [place] [facility] where he worked as a .    Providers: The patient has been receiving care at [facility] prior to this.     Review of Systems   Unable to perform ROS: Acuity of condition     Physical Exam   Patient Vitals for the past 24 hrs:   BP Temp Temp src Pulse Heart Rate Resp SpO2 Height Weight   06/23/18 1050 140/72 99.4  F (37.4  C) Temporal 73 73 16 95 % 1.702 m (5' 7\") 72.6 kg (160 lb)      Physical Exam  Constitutional:  Oriented to person, place, and time. Quiet      Appears well-developed and well-nourished.   HENT:   Head:    Normocephalic and atraumatic.   Right Ear:   Tympanic membrane and external ear normal. Cerumen present  Left Ear:   Tympanic membrane and external ear normal. Cerumen present  Mouth/Throat:   Oropharynx is clear and moist.      Mucous membranes are normal.   Eyes:    Conjunctivae normal and EOM are normal.      Pupils are equal, round, and reactive to light.   Neck:    Normal range of motion. Neck supple.   Cardiovascular:  Normal rate, regular rhythm, S1 normal and S2 normal.      No gallop and no friction rub. No murmur heard.  Pulmonary/Chest:  Breath sounds normal. No respiratory distress.      No wheezes. No rhonchi. No rales.   Abdominal:   Soft. No hepatosplenomegaly. No " tenderness.      No rebound and no CVA tenderness.   Musculoskeletal:  Normal range of motion. Left ankle and foot are swollen but not the remainder of the leg. No erythema or warmth of the leg.   Neurological:   Alert and oriented to person, place, and time.      Almost complete left hemiparesis      GCS eye subscore is 4. GCS verbal subscore is 5.      GCS motor subscore is 6.   Skin:    Skin is warm and dry.   Psychiatric:   Appears content, but difficult to assess due to language barrier.   Emergency Department Course     Imaging:  Radiology findings were communicated with the patient and family who voiced understanding of the findings.    XR Chest 2 views:  IMPRESSION: Negative.  Report per radiology     US Lower Extremity Venous Duplex Left:  FINDINGS: Negative. No evidence for DVT. Short term followup  recommended if symptoms persist.      Doppler wave form analysis performed.   Report per radiology     Laboratory:  Laboratory findings were communicated with the patient and family who voiced understanding of the findings.    CBC: AWNL. (WBC 6.0, HGB 13.9, )   CMP: Glucose 119 (H) o/w WNL (Creatinine 0.77)    Lactic Acid: 1.3  UA: Yellow, clear urine. Blood urine trace, Protein Albumin Urine 10, Nitrite Urine positive, Leukocyte Esterase Urine Large, WBC Urine 28 (H), RBC Urine 4 (H), Bacteria Urine moderate o/w WNL    Blood cultures: Pending  Blood cultures: Pending    Interventions:  1235 - NS Bolus 1,000mL IV   1304 - Rocephin 1g IV    Emergency Department Course:  Nursing notes and vitals reviewed.  The patient was sent for a XR Chest 2 views, US Lower Extremity Venous duplex Left while in the emergency department, results above.   IV was inserted and blood was drawn for laboratory testing, results above.  The patient provided a urine sample here in the emergency department. This was sent for laboratory testing, findings above.    1110: I performed an exam of the patient as documented above.      1252: Patient rechecked and updated.  The patient would like to go home.    1320: Patient rechecked and updated.  The family changed their mind and the patient would like to be admitted.     1454: I spoke with Dr. Hameed of the Hospitalist service regarding patient's presentation, findings, and plan of care.    Findings and plan explained to the Patient, spouse and nephew who consents to admission. Discussed the patient with Dr. Hameed, who will admit the patient to a OBS bed for further monitoring, evaluation, and treatment.  I personally reviewed the laboratory and imaging results with the Patient, spouse and nephew and answered all related questions prior to admission.    Impression & Plan      Medical Decision Making:  Patient is a 73-year-old [place] male who is brought in by family for concern and change in status over the last 3 days.  They state he has vomited occasionally which they relate when he takes metformin although he has had metformin for many years.  They also note that he has had decreased PO intake and not been sleeping as well.  He has been able to keep down some PO's.  On evaluation here, he appears nontoxic.  His laboratory work is remarkable for UTI.  He does note that he has had some dysuria over the last couple weeks.  Of note he was hospitalized last year at [facility] for E. coli sepsis related to prostatic abscess and then had TURP.  I have discussed with them that I think it is reasonable for him to stay overnight for observation for further IV antibiotics to make sure his condition does not worsen given his history however he strongly wishes to go home.  The wife and nephew say that they will keep a close eye on him and return if he seems worse.  Otherwise he will follow-up in 2 days with his primary.  He is given an IV dose of Rocephin here.    Addendum: Family has discussed the matter over time and is decided that due to his vomiting and change in status this was week  they wish him observed overnight to be sure that he is feeling better.  I think this is reasonable.  I talked to Dr. Hameed who kindly accepts the patient.    Diagnosis:    ICD-10-CM    1. Acute UTI N39.0        Disposition:  Admitted to OBS     Scribe Disclosure:  I, Joilinda Murciaon, am serving as a scribe at 11:11 AM on 6/23/2018 to document services personally performed by Chiqui Moreno MD based on my observations and the provider's statements to me.   6/23/2018    EMERGENCY DEPARTMENT       Chiqui Moreno MD  06/23/18 6765

## 2018-06-23 NOTE — ED NOTES
"[facility]  ED Nurse Handoff Report    ED Chief complaint: Leg Swelling (Family reports failure to thrive/eat and also left leg swelling. Prior Hx of CVA  2015)      ED Diagnosis:   Final diagnoses:   Acute UTI       Code Status: Full Code    Allergies: No Known Allergies    Activity level - Baseline/Home:  Unable to Assess    Activity Level - Current:   Unable to Assess not assessed in ER< has left sided hemiparesis,.      Needed?: Yes, if family isn't here he speaks [place].     Isolation: No  Infection: Not Applicable  Bariatric?: No    Vital Signs:   Vitals:    06/23/18 1050 06/23/18 1247 06/23/18 1300 06/23/18 1334   BP: 140/72 149/79  149/79   Pulse: 73   70   Resp: 16   14   Temp: 99.4  F (37.4  C)      TempSrc: Temporal      SpO2: 95% 97% 98% 98%   Weight: 72.6 kg (160 lb)      Height: 1.702 m (5' 7\")          Cardiac Rhythm: ,        Pain level: 0-10 Pain Scale: 5    Is this patient confused?: Yes , family stated pt is baseline confused. Pt has been appropriate..   [place] - Suicide Severity Rating Scale Completed?  Yes  If yes, what color did the patient score?  White    Patient Report: Initial Complaint: nausea , intermittently, left ankle swelling, urinary pain .   Focused Assessment: alert, pleasant , jesica;;y stable pt. Family present.   Tests Performed: labs blood and urine chest xray and leg US.   Abnormal Results: urine positive for UTI .   Treatments provided: antibiotics, ivf, admission .       Family Comments: Nephew present and ok for interpreting...Wife present does not speak english aw well. Cousin came by later.  ,     OBS brochure/video discussed/provided to patient: paper given and needs to be discussed wit family who is interpreting at the time of this note.      ED Medications:   Medications   0.9% sodium chloride BOLUS (0 mLs Intravenous Stopped 6/23/18 1332)     Followed by   sodium chloride 0.9% infusion ( Intravenous Canceled Entry 6/23/18 1301) "   ondansetron (ZOFRAN) injection 4 mg (not administered)   cefTRIAXone (ROCEPHIN) 1 g vial to attach to  mL bag for ADULTS or NS 50 mL bag for PEDS (0 g Intravenous Stopped 6/23/18 1331)       Drips infusing?:  No    For the majority of the shift this patient was Green.   Interventions performed were not needed . .    Severe Sepsis OR Septic Shock Diagnosis Present: No      ED NURSE PHONE NUMBER: [phone]  [place]

## 2018-06-23 NOTE — PLAN OF CARE
Problem: Patient Care Overview  Goal: Plan of Care/Patient Progress Review  Outcome: No Change  New admit from ER d/t UTI. Received a dose of ABX in the ER. He also complain of constipation, last BM was eight days a go. Prune juice was ordered for him. He is A & O times four, but he has baseline cognitive issue/forgetfulness d/t previous CVI. He has left hemiplegia. Assessment of lung & heart WDL. He is incontinent of urine. He has left lower ext edema. BGM was 160, he is on SS insulin. He is German speaking and needs an  for care. He is a total care. At home, his wife is primary care giver, but now, it is becoming a lot for her. Note left for MD, for  consult at discharge. Continue to monitor.

## 2018-06-23 NOTE — DISCHARGE INSTRUCTIONS
Return if you have worsening symptoms.   Discharge Instructions  Urinary Tract Infection  You or your child have been diagnosed with a urinary tract infection, or UTI. The urinary tract includes the kidneys (which make urine/pee), ureters (the tubes that carry urine/pee from the kidneys to the bladder), the bladder (which stores urine/pee), and urethra (the tube that carries urine/pee out of the bladder). Urinary tract infections occur when bacteria travel up the urethra into the bladder (bladder infection) and, in some cases, from there into the kidneys (kidney infection).  Generally, every Emergency Department visit should have a follow-up clinic visit with either a primary or a specialty clinic/provider. Please follow-up as instructed by your emergency provider today.  Return to the Emergency Department if:    You or your child have severe back pain.    You or your child are vomiting (throwing up) so that you cannot take your medicine.    You or your child have a new fever (had not previously had a fever) over 101 F.    You or your child have confusion or are very weak, or feel very ill.    Your child seems much more ill, will not wake up, will not respond right, or is crying for a long time and will not calm down.    You or your child are showing signs of dehydration. These signs may include decreased urination (pee), dry mouth/gums/tongue, or decreased activity.    Follow-up with your provider:     Children under 24 months need to be seen by their regular provider within one week after a diagnosis of a UTI. It may be necessary to do some more tests to look at the child s kidney or bladder.    You should begin to feel better within 24 - 48 hours of starting your antibiotic; follow-up with your regular clinic/doctor/provider if this is not the case.    Treatment:     You will be treated with an antibiotic to kill the bacteria. We have to make an educated guess, based on what we know about common bacteria and  "antibiotics, as to which antibiotic will work for your infection. We will be correct most times but there will be some cases where the antibiotic chosen is not correct (see urine cultures below).    Take a pain medication such as acetaminophen (Tylenol ) or ibuprofen (Advil , Motrin , Nuprin ).    Phenazopyridine (Pyridium , Uristat ) is a prescription medication that numbs the bladder to reduce the burning pain of some UTIs.  The same medication is available in a non-prescription version (Azo-Standard , Urodol ). This medication will change the color of the urine and tears (usually blue or orange). If you wear contacts, do not wear them while taking this medication as they may be stained by the medication.    Urine Cultures:    If indicated, a urine culture may have been performed today. This test generally takes 24-48 hours to complete so the results are not known at this time. The results can confirm that an infection is present but also determine which antibiotic is effective for the specific bacteria that is causing the infection. If your urine culture shows that the antibiotic you were given today will not work to treat your infection, we will attempt to contact you to make arrangements to change the antibiotic. If the culture confirms that the antibiotic is effective for your infection, you will not be contacted. We often recommend follow-up with your regular physician/provider on the culture results regardless of this process.    Antibiotic Warning:     If you have been placed on antibiotics - watch for signs of allergic reaction.  These include rash, lip swelling, difficulty breathing, wheezing, and dizziness.  If you develop any of these symptoms, stop the antibiotic immediately and go to an emergency room or urgent care for evaluation.    Probiotics: If you have been given an antibiotic, you may want to also take a probiotic pill or eat yogurt with live cultures. Probiotics have \"good bacteria\" to help " your intestines stay healthy. Studies have shown that probiotics help prevent diarrhea and other intestine problems (including C. diff infection) when you take antibiotics. You can buy these without a prescription in the pharmacy section of the store.   If you were given a prescription for medicine here today, be sure to read all of the information (including the package insert) that comes with your prescription.  This will include important information about the medicine, its side effects, and any warnings that you need to know about.  The pharmacist who fills the prescription can provide more information and answer questions you may have about the medicine.  If you have questions or concerns that the pharmacist cannot address, please call or return to the Emergency Department.   Remember that you can always come back to the Emergency Department if you are not able to see your regular provider in the amount of time listed above, if you get any new symptoms, or if there is anything that worries you.

## 2018-06-23 NOTE — IP AVS SNAPSHOT
[facility] Unit    [address] [place] [address]    Phone:  [phone]                                       After Visit Summary   6/23/2018    Hali Miller    MRN: [id number]           After Visit Summary Signature Page     I have received my discharge instructions, and my questions have been answered. I have discussed any challenges I see with this plan with the nurse or doctor.    ..........................................................................................................................................  Patient/Patient Representative Signature      ..........................................................................................................................................  Patient Representative Print Name and Relationship to Patient    ..................................................               ................................................  Date                                            Time    ..........................................................................................................................................  Reviewed by Signature/Title    ...................................................              ..............................................  Date                                                            Time

## 2018-06-23 NOTE — ED AVS SNAPSHOT
Emergency Department    [address] [facility] [address]    Phone:  [phone]    Fax:  [phone]                                       Hali Miller   MRN: [id number]    Department:   Emergency Department   Date of Visit:  6/23/2018           Patient Information     Date Of Birth          1945        Your diagnoses for this visit were:     Acute UTI        You were seen by Chiquis hCung MD and Chiqui Moreno MD.      Follow-up Information     Follow up with [facility].    Why:  2 days    Contact information:    [address]  [facility] [address] [phone]          Discharge Instructions       Return if you have worsening symptoms.   Discharge Instructions  Urinary Tract Infection  You or your child have been diagnosed with a urinary tract infection, or UTI. The urinary tract includes the kidneys (which make urine/pee), ureters (the tubes that carry urine/pee from the kidneys to the bladder), the bladder (which stores urine/pee), and urethra (the tube that carries urine/pee out of the bladder). Urinary tract infections occur when bacteria travel up the urethra into the bladder (bladder infection) and, in some cases, from there into the kidneys (kidney infection).  Generally, every Emergency Department visit should have a follow-up clinic visit with either a primary or a specialty clinic/provider. Please follow-up as instructed by your emergency provider today.  Return to the Emergency Department if:    You or your child have severe back pain.    You or your child are vomiting (throwing up) so that you cannot take your medicine.    You or your child have a new fever (had not previously had a fever) over 101 F.    You or your child have confusion or are very weak, or feel very ill.    Your child seems much more ill, will not wake up, will not respond right, or is crying for a long time and will not calm down.    You or your child are  showing signs of dehydration. These signs may include decreased urination (pee), dry mouth/gums/tongue, or decreased activity.    Follow-up with your provider:     Children under 24 months need to be seen by their regular provider within one week after a diagnosis of a UTI. It may be necessary to do some more tests to look at the child s kidney or bladder.    You should begin to feel better within 24 - 48 hours of starting your antibiotic; follow-up with your regular clinic/doctor/provider if this is not the case.    Treatment:     You will be treated with an antibiotic to kill the bacteria. We have to make an educated guess, based on what we know about common bacteria and antibiotics, as to which antibiotic will work for your infection. We will be correct most times but there will be some cases where the antibiotic chosen is not correct (see urine cultures below).    Take a pain medication such as acetaminophen (Tylenol ) or ibuprofen (Advil , Motrin , Nuprin ).    Phenazopyridine (Pyridium , Uristat ) is a prescription medication that numbs the bladder to reduce the burning pain of some UTIs.  The same medication is available in a non-prescription version (Azo-Standard , Urodol ). This medication will change the color of the urine and tears (usually blue or orange). If you wear contacts, do not wear them while taking this medication as they may be stained by the medication.    Urine Cultures:    If indicated, a urine culture may have been performed today. This test generally takes 24-48 hours to complete so the results are not known at this time. The results can confirm that an infection is present but also determine which antibiotic is effective for the specific bacteria that is causing the infection. If your urine culture shows that the antibiotic you were given today will not work to treat your infection, we will attempt to contact you to make arrangements to change the antibiotic. If the culture confirms that  "the antibiotic is effective for your infection, you will not be contacted. We often recommend follow-up with your regular physician/provider on the culture results regardless of this process.    Antibiotic Warning:     If you have been placed on antibiotics - watch for signs of allergic reaction.  These include rash, lip swelling, difficulty breathing, wheezing, and dizziness.  If you develop any of these symptoms, stop the antibiotic immediately and go to an emergency room or urgent care for evaluation.    Probiotics: If you have been given an antibiotic, you may want to also take a probiotic pill or eat yogurt with live cultures. Probiotics have \"good bacteria\" to help your intestines stay healthy. Studies have shown that probiotics help prevent diarrhea and other intestine problems (including C. diff infection) when you take antibiotics. You can buy these without a prescription in the pharmacy section of the store.   If you were given a prescription for medicine here today, be sure to read all of the information (including the package insert) that comes with your prescription.  This will include important information about the medicine, its side effects, and any warnings that you need to know about.  The pharmacist who fills the prescription can provide more information and answer questions you may have about the medicine.  If you have questions or concerns that the pharmacist cannot address, please call or return to the Emergency Department.   Remember that you can always come back to the Emergency Department if you are not able to see your regular provider in the amount of time listed above, if you get any new symptoms, or if there is anything that worries you.    24 Hour Appointment Hotline       To make an appointment at any [facility], call [phone] ([phone]). If you don't have a family doctor or clinic, we will help you find one. [facility] are conveniently located to serve the needs " of you and your family.             Review of your medicines      START taking        Dose / Directions Last dose taken    cephALEXin 500 MG capsule   Commonly known as:  KEFLEX   Dose:  500 mg   Quantity:  40 capsule        Take 1 capsule (500 mg) by mouth 4 times daily for 10 days   Refills:  0        ondansetron 4 MG ODT tab   Commonly known as:  ZOFRAN ODT   Dose:  4 mg   Quantity:  10 tablet        Take 1 tablet (4 mg) by mouth every 4 hours as needed for nausea   Refills:  0          Our records show that you are taking the medicines listed below. If these are incorrect, please call your family doctor or clinic.        Dose / Directions Last dose taken    LIPITOR PO        Refills:  0        LISINOPRIL PO   Dose:  10 mg        Take 10 mg by mouth daily   Refills:  0        METFORMIN HCL PO   Dose:  500 mg        Take 500 mg by mouth 2 times daily (with meals)   Refills:  0                Prescriptions were sent or printed at these locations (2 Prescriptions)                   Other Prescriptions                Printed at Department/Unit printer (2 of 2)         cephALEXin (KEFLEX) 500 MG capsule               ondansetron (ZOFRAN ODT) 4 MG ODT tab                Procedures and tests performed during your visit     Procedure/Test Number of Times Performed    Blood culture 2    CBC with platelets differential 1    Comprehensive metabolic panel 1    Lactic acid whole blood 1    UA with Microscopic 1    US Lower Extremity Venous Duplex Left 1    Urine Culture Aerobic Bacterial 1    XR Chest 2 Views 1      Orders Needing Specimen Collection     None      Pending Results     Date and Time Order Name Status Description    6/23/2018 1206 Urine Culture Aerobic Bacterial In process     6/23/2018 1120 Blood culture In process     6/23/2018 1120 Blood culture In process             Pending Culture Results     Date and Time Order Name Status Description    6/23/2018 1206 Urine Culture Aerobic Bacterial In process      6/23/2018 1120 Blood culture In process     6/23/2018 1120 Blood culture In process             Pending Results Instructions     If you had any lab results that were not finalized at the time of your Discharge, you can call the ED Lab Result RN at [phone]. You will be contacted by this team for any positive Lab results or changes in treatment. The nurses are available 7 days a week from 10A to 6:30P.  You can leave a message 24 hours per day and they will return your call.        Test Results From Your Hospital Stay        6/23/2018 11:43 AM      Component Results     Component Value Ref Range & Units Status    WBC 6.0 4.0 - 11.0 10e9/L Final    RBC Count 4.73 4.4 - 5.9 10e12/L Final    Hemoglobin 13.9 13.3 - 17.7 g/dL Final    Hematocrit 41.5 40.0 - 53.0 % Final    MCV 88 78 - 100 fl Final    MCH 29.4 26.5 - 33.0 pg Final    MCHC 33.5 31.5 - 36.5 g/dL Final    RDW 13.1 10.0 - 15.0 % Final    Platelet Count 170 150 - 450 10e9/L Final    Diff Method Automated Method  Final    % Neutrophils 57.9 % Final    % Lymphocytes 30.1 % Final    % Monocytes 7.3 % Final    % Eosinophils 4.0 % Final    % Basophils 0.5 % Final    % Immature Granulocytes 0.2 % Final    Nucleated RBCs 0 0 /100 Final    Absolute Neutrophil 3.5 1.6 - 8.3 10e9/L Final    Absolute Lymphocytes 1.8 0.8 - 5.3 10e9/L Final    Absolute Monocytes 0.4 0.0 - 1.3 10e9/L Final    Absolute Eosinophils 0.2 0.0 - 0.7 10e9/L Final    Absolute Basophils 0.0 0.0 - 0.2 10e9/L Final    Abs Immature Granulocytes 0.0 0 - 0.4 10e9/L Final    Absolute Nucleated RBC 0.0  Final         6/23/2018 12:00 PM      Component Results     Component Value Ref Range & Units Status    Sodium 141 133 - 144 mmol/L Final    Potassium 3.8 3.4 - 5.3 mmol/L Final    Chloride 109 94 - 109 mmol/L Final    Carbon Dioxide 26 20 - 32 mmol/L Final    Anion Gap 6 3 - 14 mmol/L Final    Glucose 119 (H) 70 - 99 mg/dL Final    Urea Nitrogen 10 7 - 30 mg/dL Final    Creatinine 0.77 0.66 - 1.25 mg/dL  Final    GFR Estimate >90 >60 mL/min/1.7m2 Final    Non  GFR Calc    GFR Estimate If Black >90 >60 mL/min/1.7m2 Final    African American GFR Calc    Calcium 8.6 8.5 - 10.1 mg/dL Final    Bilirubin Total 0.6 0.2 - 1.3 mg/dL Final    Albumin 3.5 3.4 - 5.0 g/dL Final    Protein Total 7.5 6.8 - 8.8 g/dL Final    Alkaline Phosphatase 86 40 - 150 U/L Final    ALT 12 0 - 70 U/L Final    AST 12 0 - 45 U/L Final         6/23/2018 11:44 AM      Component Results     Component Value Ref Range & Units Status    Lactic Acid 1.3 0.7 - 2.0 mmol/L Final         6/23/2018 11:42 AM         6/23/2018 12:40 PM         6/23/2018 12:03 PM      Component Results     Component Value Ref Range & Units Status    Color Urine Yellow  Final    Appearance Urine Clear  Final    Glucose Urine Negative NEG^Negative mg/dL Final    Bilirubin Urine Negative NEG^Negative Final    Ketones Urine Negative NEG^Negative mg/dL Final    Specific Gravity Urine 1.014 1.003 - 1.035 Final    Blood Urine Trace (A) NEG^Negative Final    pH Urine 5.5 5.0 - 7.0 pH Final    Protein Albumin Urine 10 (A) NEG^Negative mg/dL Final    Urobilinogen mg/dL Normal 0.0 - 2.0 mg/dL Final    Nitrite Urine Positive (A) NEG^Negative Final    Leukocyte Esterase Urine Large (A) NEG^Negative Final    Source Midstream Urine  Final    WBC Urine 28 (H) 0 - 5 /HPF Final    RBC Urine 4 (H) 0 - 2 /HPF Final    Bacteria Urine Moderate (A) NEG^Negative /HPF Final    Squamous Epithelial /HPF Urine 1 0 - 1 /HPF Final         6/23/2018 12:04 PM      Narrative     XR CHEST 2 VW 6/23/2018 12:03 PM    HISTORY: fever;         Impression     IMPRESSION: Negative.    JEAN-PIERRE POOLE MD         6/23/2018 12:27 PM      Narrative     US LOWER EXTREMITY VENOUS DUPLEX LEFT 6/23/2018 12:24 PM    HISTORY:  Leg swelling.    FINDINGS: Negative. No evidence for DVT. Short term followup  recommended if symptoms persist.     Doppler wave form analysis performed.    JEAN-PIERRE POOLE MD          6/23/2018 12:11 PM                Clinical Quality Measure: Blood Pressure Screening     Your blood pressure was checked while you were in the emergency department today. The last reading we obtained was  BP: 149/79 . Please read the guidelines below about what these numbers mean and what you should do about them.  If your systolic blood pressure (the top number) is less than 120 and your diastolic blood pressure (the bottom number) is less than 80, then your blood pressure is normal. There is nothing more that you need to do about it.  If your systolic blood pressure (the top number) is 120-139 or your diastolic blood pressure (the bottom number) is 80-89, your blood pressure may be higher than it should be. You should have your blood pressure rechecked within a year by a primary care provider.  If your systolic blood pressure (the top number) is 140 or greater or your diastolic blood pressure (the bottom number) is 90 or greater, you may have high blood pressure. High blood pressure is treatable, but if left untreated over time it can put you at risk for heart attack, stroke, or kidney failure. You should have your blood pressure rechecked by a primary care provider within the next 4 weeks.  If your provider in the emergency department today gave you specific instructions to follow-up with your doctor or provider even sooner than that, you should follow that instruction and not wait for up to 4 weeks for your follow-up visit.        Thank you for choosing [facility]       Thank you for choosing [facility] for your care. Our goal is always to provide you with excellent care. Hearing back from our patients is one way we can continue to improve our services. Please take a few minutes to complete the written survey that you may receive in the mail after you visit with us. Thank you!        [facility] Information     [employer] lets you send messages to your doctor, view your test results, renew your prescriptions, schedule  "appointments and more. To sign up, go to [email or web address] . Click on \"Log in\" on the left side of the screen, which will take you to the Welcome page. Then click on \"Sign up Now\" on the right side of the page.     You will be asked to enter the access code listed below, as well as some personal information. Please follow the directions to create your username and password.     Your access code is: [email or web address]  Expires: 2018  1:33 PM     Your access code will  in 90 days. If you need help or a new code, please call your [facility] clinic or [phone].        Care EveryWhere ID     This is your Care EveryWhere ID. This could be used by other organizations to access your [facility] medical records  [id number]        Equal Access to Services     MAYELA CROOKS : Ezekiel Lackey, jenny veloz, brennan castillo, suhail lovelace. So [facility] [phone].    ATENCIÓN: Si habla español, tiene a chase disposición servicios gratuitos de asistencia lingüística. Llame al [phone].    We comply with applicable federal civil rights laws and [place] laws. We do not discriminate on the basis of race, color, national origin, age, disability, sex, sexual orientation, or gender identity.            After Visit Summary       This is your record. Keep this with you and show to your community pharmacist(s) and doctor(s) at your next visit.                  "

## 2018-06-24 VITALS
TEMPERATURE: 97.1 F | HEIGHT: 68 IN | HEART RATE: 67 BPM | DIASTOLIC BLOOD PRESSURE: 80 MMHG | BODY MASS INDEX: 23.3 KG/M2 | WEIGHT: 153.7 LBS | OXYGEN SATURATION: 96 % | RESPIRATION RATE: 16 BRPM | SYSTOLIC BLOOD PRESSURE: 145 MMHG

## 2018-06-24 LAB
GLUCOSE BLDC GLUCOMTR-MCNC: 115 MG/DL (ref 70–99)
GLUCOSE BLDC GLUCOMTR-MCNC: 85 MG/DL (ref 70–99)
GLUCOSE BLDC GLUCOMTR-MCNC: 91 MG/DL (ref 70–99)

## 2018-06-24 PROCEDURE — G0378 HOSPITAL OBSERVATION PER HR: HCPCS

## 2018-06-24 PROCEDURE — 99207 ZZC CDG-CODE CATEGORY CHANGED: CPT | Performed by: PHYSICIAN ASSISTANT

## 2018-06-24 PROCEDURE — 99217 ZZC OBSERVATION CARE DISCHARGE: CPT | Performed by: PHYSICIAN ASSISTANT

## 2018-06-24 PROCEDURE — 25000128 H RX IP 250 OP 636: Performed by: INTERNAL MEDICINE

## 2018-06-24 PROCEDURE — 25000128 H RX IP 250 OP 636: Performed by: HOSPITALIST

## 2018-06-24 PROCEDURE — 25000132 ZZH RX MED GY IP 250 OP 250 PS 637: Performed by: HOSPITALIST

## 2018-06-24 PROCEDURE — 96372 THER/PROPH/DIAG INJ SC/IM: CPT

## 2018-06-24 PROCEDURE — 00000146 ZZHCL STATISTIC GLUCOSE BY METER IP

## 2018-06-24 RX ORDER — CIPROFLOXACIN 500 MG/1
500 TABLET, FILM COATED ORAL 2 TIMES DAILY
Qty: 6 TABLET | Refills: 0 | Status: SHIPPED | OUTPATIENT
Start: 2018-06-24 | End: 2018-06-24

## 2018-06-24 RX ORDER — CIPROFLOXACIN 500 MG/1
500 TABLET, FILM COATED ORAL 2 TIMES DAILY
Qty: 14 TABLET | Refills: 0 | Status: SHIPPED | OUTPATIENT
Start: 2018-06-24 | End: 2018-07-01

## 2018-06-24 RX ADMIN — LISINOPRIL 10 MG: 10 TABLET ORAL at 08:53

## 2018-06-24 RX ADMIN — CEFTRIAXONE 1 G: 1 INJECTION, POWDER, FOR SOLUTION INTRAMUSCULAR; INTRAVENOUS at 12:00

## 2018-06-24 RX ADMIN — ASPIRIN 81 MG 81 MG: 81 TABLET ORAL at 08:53

## 2018-06-24 RX ADMIN — SODIUM CHLORIDE 1000 ML: 9 INJECTION, SOLUTION INTRAVENOUS at 00:21

## 2018-06-24 NOTE — PROGRESS NOTES
Observation Goals:     -diagnostic tests and consults completed and resulted  - partially met  -vital signs normal or at patient baseline - met  -infection is improving - partially met

## 2018-06-24 NOTE — PLAN OF CARE
"Problem: Patient Care Overview  Goal: Plan of Care/Patient Progress Review  Outcome: No Change  AVSS; pt denied pain overnight;  phone used, (pt's language is Luxem[place]ish), with wife helping to ask patient questions via ; pt denied pain; bgm 85 at about 0200;  incontinent of moderate amounts of urine; turned and repositioned prn; sm 1/8 \" pink, open area noted on right buttock; left side with minimal to no movement secondary to h/o CVI; left lower extremity edema 1+-2+; pt c/o constipation; given stool softeners on pm shift; plan for social work consult; pt appeared to sleep well overnight; continue to monitor.       "

## 2018-06-24 NOTE — PROGRESS NOTES
Care Transition Initial Assessment -   Reason For Consult: discharge planning  Met with: Patient and Family    Active Problems:    UTI (urinary tract infection)       DATA  Lives With: spouse  Description of Support System: Supportive, Involved  Who is your support system?: Wife, Other ([place])  Identified issues/concerns regarding health management: Discharge planning   Resources List: Home Care   Quality Of Family Relationships: supportive, involved, helpful     Per social service protocol for discharge planning. Patient was admitted on 06-23-18 with UTI. The tentative date of discharge is 6-24-18. Reviewed chart and met with patient, his cousin, nephew and his wife. Per family, patient has an appointment to establish with primary care at [facility] in [place], [place]. Per cousin, patient is a total care and his wife provides the cares. Lucinda's cousin reports that patient returned from [facility] about a 1 1/2 year ago. He reports that the family moved patient from the house in [place] to a house in [place] to make it easier for patient's wife. He notes that the house in [place] had too many steps. Patient's family provide new address as [address] [place] [address]. Patient's family is open to working with HC. SW provided list of agencies. Explained the referral process. Patient's family chose [facility]. SW sent referral email [facility]. Patient's family declined for  to assist with transportation. They report that they are able to transport him at this time.      ASSESSMENT  Cognitive Status:  Unable to assess   Concerns to be addressed: Discharge planning .     PLAN  Financial costs for the patient includes Home Care.  Patient given options and choices for discharge Home care providers list .  Patient/family is agreeable to the plan?  Yes:   Patient Goals and Preferences: Home with HC.  Patient anticipates discharging to:  Home .

## 2018-06-24 NOTE — DISCHARGE SUMMARY
[facility]    Discharge Summary  Hospitalist    Date of Admission:  6/23/2018  Date of Discharge:  6/24/2018  Discharging Provider: Lian Jo PA-C    Discharge Diagnoses   Urinary tract infection  Hypertension  Type 2 diabetes  History of CVA with left hemiparesis     History of Present Illness   Hali Miller is an 73 year old Amheric-speaking male with past history DM II, HTN, CVA with left hemiparesis, sepsis due to prostate abscess s/p TURP 8/2017 who presents with dysuria and anorexia History is obtained through Nephew who serves as . Per family report the patient had been complaining of dysuria over the past 1-2 weeks. Family also noted poor appetite and fever. Family was concerned for infection due to his history of prostate abscess and brought him to the ED for evaluation. Please see H&P by Dr. Hameed for additional information.     On day of discharge patient reports he is feeling well. He denies pain. Family feels he seems much more likely himself. Good appetite this breakfast. Denies nausea, abdominal pain, chest pain or shortness of breath.     Hospital Course   Hali Miller was admitted on 6/23/2018.  The following problems were addressed during his hospitalization:    Urinary tract infection. UA grossly positive with 29 WBV, positive nitrites and LE. He noted dysuria prior to admission. He has remained afebrile without SIRS criteria to suggest sepsis. Urine culture growing >100,000 colonies lactose fermenting gram negative rods. Susceptibilities still pending.   --he is s/p 2 doses IV Rocephin, will continue Cipro at discharge for additional 7 days  --follow urine culture     History of CVA with left hemiparesis. At baseline per family. He is essentially bed bound with 24 hour care from family; family requests additional services. Will discharge with home RN, health aide, PT, OT and social work. Continue aspirin and statin at discharge.     JAYDEN  edema. US negative for DVT     Type 2 Diabetes mellitus. Resume metformin at discharge.     Hypertension. Continued PTA lisinopril    This patient was seen and examined with Dr. Hameed who agrees with the above plan.    Lian Jo PA-C    Significant Results and Procedures   See below     Pending Results   These results will be followed up by Lian Jo PA-C  Unresulted Labs Ordered in the Past 30 Days of this Admission     Date and Time Order Name Status Description    6/23/2018 1206 Urine Culture Aerobic Bacterial Preliminary     6/23/2018 1120 Blood culture Preliminary     6/23/2018 1120 Blood culture Preliminary           Code Status   Full Code       Primary Care Physician   [facility]    Physical Exam   Temp: 97.1  F (36.2  C) Temp src: Axillary BP: 145/80 Pulse: 67   Resp: 16 SpO2: 96 % O2 Device: None (Room air)    Vitals:    06/23/18 1050 06/23/18 1700   Weight: 72.6 kg (160 lb) 69.7 kg (153 lb 11.2 oz)     Vital Signs with Ranges  Temp:  [97.1  F (36.2  C)-97.8  F (36.6  C)] 97.1  F (36.2  C)  Pulse:  [67-88] 67  Resp:  [14-16] 16  BP: (145-149)/(72-83) 145/80  SpO2:  [96 %-98 %] 96 %  I/O last 3 completed shifts:  In: 600 [I.V.:600]  Out: -     Constitutional: Alert, laying down in bed. Answers questions through nephew who is . Appears comfortable.   ENT: normal cephalic, moist mucous membranes  Eyes:  Pupils are equal and reactive to light, EOMI  Respiratory: shallow inspiration, lungs clear without crackles or wheezing. Breathing comfortably   Cardiovascular: Regular rate and rhythm, no murmur, trace to 1+ LLE edema, distal pulses +2/4  GI:  active bowel sounds, abdomen soft, non-tender  Skin/Integumen: warm, dry  Neuro:  Baseline left hemiparesis. Unchanged per family. CN appear grossly intact.   MSK:  Moves RUE and RLE freely. Left hemiparesis.       Discharge Disposition   Discharged to home  Condition at discharge: Stable    Consultations This Hospital  Stay   SOCIAL WORK IP CONSULT  SOCIAL WORK IP CONSULT    Time Spent on this Encounter   I, Lian Jo, personally saw the patient today and spent greater than 30 minutes discharging this patient.    Discharge Orders     Home care nursing referral     Home Care PT Referral for Hospital Discharge     Home Care OT Referral for Hospital Discharge     Home Care Social Service Referral for Hospital Discharge     Reason for your hospital stay   You were here for evaluation of weakness and urinary tract infection.     Follow-up and recommended labs and tests    Follow up with primary care provider next week to establish care and for hospital follow up.     Activity   Your activity upon discharge: activity as tolerated     Discharge Instructions   Continue antibiotic for 3 days    Make sure to drink plenty of fluids    For constipation at home you can try over the counter miralax or senna. You can titrate dose of miralax as needed: for example- start with one capful per day. If stools are too loose you can try every other day or use only 1/2 capful instead.     MD face to face encounter   Documentation of Face to Face and Certification for Home Health Services    I certify that patient: Hali Miller is under my care and that I, or a nurse practitioner or physician's assistant working with me, had a face-to-face encounter that meets the physician face-to-face encounter requirements with this patient on: 6/24/2018.    This encounter with the patient was in whole, or in part, for the following medical condition, which is the primary reason for home health care: history of CVA, generalized weakness. bedbound.    I certify that, based on my findings, the following services are medically necessary home health services: Nursing, Occupational Therapy and Physical Therapy.    My clinical findings support the need for the above services because: Nurse is needed: to monitor vital signs, hydration status, help with  bowel regimen., Occupational Therapy Services are needed to assess and treat cognitive ability and address ADL safety due to impairment in mobility after stroke. and Physical Therapy Services are needed to assess and treat the following functional impairments: decreased mobility after stroke    Further, I certify that my clinical findings support that this patient is homebound (i.e. absences from home require considerable and taxing effort and are for medical reasons or Hindu services or infrequently or of short duration when for other reasons) because: Requires assistance of another person or specialized equipment to access medical services because patient: Requires supervision of another for safe transfer...    Based on the above findings. I certify that this patient is confined to the home and needs intermittent skilled nursing care, physical therapy and/or speech therapy.  The patient is under my care, and I have initiated the establishment of the plan of care.  This patient will be followed by a physician who will periodically review the plan of care.  Physician/Provider to provide follow up care: [facility]    Attending hospital physician (the Medicare certified [place] provider): Dr. Niles Hameed  Physician Signature: See electronic signature associated with these discharge orders.  Date: 6/24/2018     Full Code     Diet   Follow this diet upon discharge: Orders Placed This Encounter     Moderate Consistent CHO Diet       Discharge Medications   Current Discharge Medication List      START taking these medications    Details   ciprofloxacin (CIPRO) 500 MG tablet Take 1 tablet (500 mg) by mouth 2 times daily for 7 days  Qty: 14 tablet, Refills: 0    Associated Diagnoses: Acute cystitis without hematuria      ondansetron (ZOFRAN ODT) 4 MG ODT tab Take 1 tablet (4 mg) by mouth every 4 hours as needed for nausea  Qty: 10 tablet, Refills: 0         CONTINUE these medications which have NOT  CHANGED    Details   ASPIRIN PO Take 81 mg by mouth daily      Atorvastatin Calcium (LIPITOR PO) Take 20 mg by mouth daily       LISINOPRIL PO Take 10 mg by mouth daily      METFORMIN HCL ER PO Take 1,000 mg by mouth 2 times daily           Allergies   No Known Allergies  Data   Most Recent 3 CBC's:  Recent Labs   Lab Test  06/23/18   1130   WBC  6.0   HGB  13.9   MCV  88   PLT  170      Most Recent 3 BMP's:  Recent Labs   Lab Test  06/23/18   1130   NA  141   POTASSIUM  3.8   CHLORIDE  109   CO2  26   BUN  10   CR  0.77   ANIONGAP  6   AMARI  8.6   GLC  119*     Most Recent 2 LFT's:  Recent Labs   Lab Test  06/23/18   1130   AST  12   ALT  12   ALKPHOS  86   BILITOTAL  0.6     Most Recent INR's and Anticoagulation Dosing History:  Anticoagulation Dose History     There is no flowsheet data to display.        Most Recent 3 Troponin's:No lab results found.  Most Recent Cholesterol Panel:No lab results found.  Most Recent 6 Bacteria Isolates From Any Culture (See EPIC Reports for Culture Details):  Recent Labs   Lab Test  06/23/18   1227  06/23/18   1147  06/23/18   1130   CULT  No growth after 14 hours  >100,000 colonies/mL  Lactose fermenting gram negative rods  Susceptibility testing in progress  *  No growth after 16 hours     Most Recent TSH, T4 and A1c Labs:No lab results found.  Results for orders placed or performed during the hospital encounter of 06/23/18   XR Chest 2 Views    Narrative    XR CHEST 2 VW 6/23/2018 12:03 PM    HISTORY: fever;       Impression    IMPRESSION: Negative.    JEAN-PIERRE POOLE MD   US Lower Extremity Venous Duplex Left    Narrative    US LOWER EXTREMITY VENOUS DUPLEX LEFT 6/23/2018 12:24 PM    HISTORY:  Leg swelling.    FINDINGS: Negative. No evidence for DVT. Short term followup  recommended if symptoms persist.     Doppler wave form analysis performed.    JEAN-PIERRE POOLE MD

## 2018-06-24 NOTE — PROGRESS NOTES
Observation Goals:      -diagnostic tests and consults completed and resulted  - partially met; SW to consult  -vital signs normal or at patient baseline - met  -infection is improving - partially met

## 2018-06-24 NOTE — PLAN OF CARE
Problem: Patient Care Overview  Goal: Plan of Care/Patient Progress Review  Outcome: No Change  Observation goals PRIOR TO DISCHARGE     -diagnostic tests and consults completed and resulted - met  -vital signs normal or at patient baseline - met  -infection is improving - partially met     VSS, denies pain. Up with 2A. Sj diet. Incontinent of urine. Family at bedside.  scheduled for the a.m.

## 2018-06-24 NOTE — PLAN OF CARE
Problem: Patient Care Overview  Goal: Plan of Care/Patient Progress Review  Outcome: Improving  A/Ox4; forgetful per family interpretation at bedside. VSS on RA. Pt denies any pain, N/V. Baseline L-sided weakness. Pt incontinent. Pt tolerating mod CHO diet. Pre-existing R-side buttock tear SONIA; barrier cream applied. T/R Q2H. Discharge reviewed with pt and family with interpretation provided by family. Pt to discharge home with home health care services. Transportation provided by family. Medications, instructions, and belongings sent home with patient/family. Questions answered.

## 2018-06-25 LAB
BACTERIA SPEC CULT: ABNORMAL
Lab: ABNORMAL
SPECIMEN SOURCE: ABNORMAL

## 2018-06-29 LAB
BACTERIA SPEC CULT: NO GROWTH
BACTERIA SPEC CULT: NO GROWTH
Lab: NORMAL
Lab: NORMAL
SPECIMEN SOURCE: NORMAL
SPECIMEN SOURCE: NORMAL